# Patient Record
Sex: MALE | Race: WHITE | NOT HISPANIC OR LATINO | Employment: UNEMPLOYED | URBAN - METROPOLITAN AREA
[De-identification: names, ages, dates, MRNs, and addresses within clinical notes are randomized per-mention and may not be internally consistent; named-entity substitution may affect disease eponyms.]

---

## 2021-12-18 ENCOUNTER — HOSPITAL ENCOUNTER (EMERGENCY)
Facility: HOSPITAL | Age: 26
Discharge: HOME | End: 2021-12-19
Attending: EMERGENCY MEDICINE
Payer: COMMERCIAL

## 2021-12-18 VITALS
SYSTOLIC BLOOD PRESSURE: 131 MMHG | TEMPERATURE: 99.8 F | BODY MASS INDEX: 27.92 KG/M2 | OXYGEN SATURATION: 98 % | DIASTOLIC BLOOD PRESSURE: 87 MMHG | RESPIRATION RATE: 18 BRPM | HEIGHT: 70 IN | HEART RATE: 140 BPM | WEIGHT: 195 LBS

## 2021-12-18 DIAGNOSIS — Z20.822 ENCOUNTER FOR SCREENING LABORATORY TESTING FOR COVID-19 VIRUS: ICD-10-CM

## 2021-12-18 DIAGNOSIS — J06.9 URI (UPPER RESPIRATORY INFECTION): Primary | ICD-10-CM

## 2021-12-18 PROCEDURE — 0241U HB NFCT DS VIR RESP RNA 4 TRGT: CPT | Performed by: INTERNAL MEDICINE

## 2021-12-18 PROCEDURE — 93005 ELECTROCARDIOGRAM TRACING: CPT

## 2021-12-18 PROCEDURE — 96372 THER/PROPH/DIAG INJ SC/IM: CPT

## 2021-12-18 PROCEDURE — 99284 EMERGENCY DEPT VISIT MOD MDM: CPT

## 2021-12-18 RX ORDER — KETOROLAC TROMETHAMINE 30 MG/ML
30 INJECTION, SOLUTION INTRAMUSCULAR; INTRAVENOUS ONCE
Status: COMPLETED | OUTPATIENT
Start: 2021-12-18 | End: 2021-12-18

## 2021-12-18 RX ORDER — ACETAMINOPHEN 160 MG/5ML
2 SUSPENSION, ORAL (FINAL DOSE FORM) ORAL ONCE
Status: COMPLETED | OUTPATIENT
Start: 2021-12-18 | End: 2021-12-18

## 2021-12-18 RX ADMIN — KETOROLAC TROMETHAMINE 30 MG: 30 INJECTION, SOLUTION INTRAMUSCULAR at 23:36

## 2021-12-19 LAB
ATRIAL RATE: 137 BPM
FLUAV RNA RESP QL NAA+PROBE: NEGATIVE
FLUBV RNA RESP QL NAA+PROBE: NEGATIVE
P AXIS: 73 DEGREES
PR INTERVAL: 150 MS
QRS AXIS: 84 DEGREES
QRSD INTERVAL: 86 MS
QT INTERVAL: 272 MS
QTC INTERVAL: 410 MS
RSV RNA RESP QL NAA+PROBE: NEGATIVE
SARS-COV-2 RNA RESP QL NAA+PROBE: POSITIVE
T WAVE AXIS: 58 DEGREES
VENTRICULAR RATE: 137 BPM

## 2021-12-19 PROCEDURE — 93010 ELECTROCARDIOGRAM REPORT: CPT | Performed by: INTERNAL MEDICINE

## 2021-12-19 PROCEDURE — 99284 EMERGENCY DEPT VISIT MOD MDM: CPT | Performed by: EMERGENCY MEDICINE

## 2023-09-20 ENCOUNTER — APPOINTMENT (EMERGENCY)
Dept: RADIOLOGY | Facility: HOSPITAL | Age: 28
End: 2023-09-20

## 2023-09-20 ENCOUNTER — HOSPITAL ENCOUNTER (EMERGENCY)
Facility: HOSPITAL | Age: 28
Discharge: HOME | End: 2023-09-20
Attending: EMERGENCY MEDICINE

## 2023-09-20 VITALS
SYSTOLIC BLOOD PRESSURE: 156 MMHG | BODY MASS INDEX: 37.07 KG/M2 | RESPIRATION RATE: 20 BRPM | TEMPERATURE: 98.5 F | DIASTOLIC BLOOD PRESSURE: 82 MMHG | OXYGEN SATURATION: 95 % | HEART RATE: 83 BPM | WEIGHT: 258.38 LBS

## 2023-09-20 DIAGNOSIS — U07.1 COVID-19: ICD-10-CM

## 2023-09-20 DIAGNOSIS — R42 LIGHTHEADEDNESS: Primary | ICD-10-CM

## 2023-09-20 LAB
ALBUMIN SERPL BCP-MCNC: 4.2 G/DL (ref 3.5–5)
ALP SERPL-CCNC: 109 U/L (ref 34–104)
ALT SERPL W P-5'-P-CCNC: 33 U/L (ref 7–52)
ANION GAP SERPL CALCULATED.3IONS-SCNC: 10 MMOL/L
AST SERPL W P-5'-P-CCNC: 21 U/L (ref 13–39)
ATRIAL RATE: 91 BPM
BASOPHILS # BLD AUTO: 0.03 THOUSANDS/ÂΜL (ref 0–0.1)
BASOPHILS NFR BLD AUTO: 0 % (ref 0–1)
BILIRUB SERPL-MCNC: 0.64 MG/DL (ref 0.2–1)
BUN SERPL-MCNC: 10 MG/DL (ref 5–25)
CALCIUM SERPL-MCNC: 8.8 MG/DL (ref 8.4–10.2)
CHLORIDE SERPL-SCNC: 104 MMOL/L (ref 96–108)
CO2 SERPL-SCNC: 24 MMOL/L (ref 21–32)
CREAT SERPL-MCNC: 0.86 MG/DL (ref 0.6–1.3)
EOSINOPHIL # BLD AUTO: 0.11 THOUSAND/ÂΜL (ref 0–0.61)
EOSINOPHIL NFR BLD AUTO: 1 % (ref 0–6)
ERYTHROCYTE [DISTWIDTH] IN BLOOD BY AUTOMATED COUNT: 11.9 % (ref 11.6–15.1)
GFR SERPL CREATININE-BSD FRML MDRD: 119 ML/MIN/1.73SQ M
GLUCOSE SERPL-MCNC: 123 MG/DL (ref 65–140)
HCT VFR BLD AUTO: 45.3 % (ref 36.5–49.3)
HGB BLD-MCNC: 15.9 G/DL (ref 12–17)
IMM GRANULOCYTES # BLD AUTO: 0.04 THOUSAND/UL (ref 0–0.2)
IMM GRANULOCYTES NFR BLD AUTO: 0 % (ref 0–2)
LYMPHOCYTES # BLD AUTO: 2.2 THOUSANDS/ÂΜL (ref 0.6–4.47)
LYMPHOCYTES NFR BLD AUTO: 19 % (ref 14–44)
MCH RBC QN AUTO: 30.3 PG (ref 26.8–34.3)
MCHC RBC AUTO-ENTMCNC: 35.1 G/DL (ref 31.4–37.4)
MCV RBC AUTO: 86 FL (ref 82–98)
MONOCYTES # BLD AUTO: 1.42 THOUSAND/ÂΜL (ref 0.17–1.22)
MONOCYTES NFR BLD AUTO: 12 % (ref 4–12)
NEUTROPHILS # BLD AUTO: 7.77 THOUSANDS/ÂΜL (ref 1.85–7.62)
NEUTS SEG NFR BLD AUTO: 68 % (ref 43–75)
NRBC BLD AUTO-RTO: 0 /100 WBCS
P AXIS: 60 DEGREES
PLATELET # BLD AUTO: 230 THOUSANDS/UL (ref 149–390)
PMV BLD AUTO: 8.8 FL (ref 8.9–12.7)
POTASSIUM SERPL-SCNC: 3.3 MMOL/L (ref 3.5–5.3)
PR INTERVAL: 142 MS
PROT SERPL-MCNC: 7.3 G/DL (ref 6.4–8.4)
QRS AXIS: 78 DEGREES
QRSD INTERVAL: 90 MS
QT INTERVAL: 346 MS
QTC INTERVAL: 425 MS
RBC # BLD AUTO: 5.25 MILLION/UL (ref 3.88–5.62)
SODIUM SERPL-SCNC: 138 MMOL/L (ref 135–147)
T WAVE AXIS: 7 DEGREES
VENTRICULAR RATE: 91 BPM
WBC # BLD AUTO: 11.57 THOUSAND/UL (ref 4.31–10.16)

## 2023-09-20 PROCEDURE — 99285 EMERGENCY DEPT VISIT HI MDM: CPT

## 2023-09-20 PROCEDURE — 96360 HYDRATION IV INFUSION INIT: CPT

## 2023-09-20 PROCEDURE — 99285 EMERGENCY DEPT VISIT HI MDM: CPT | Performed by: EMERGENCY MEDICINE

## 2023-09-20 PROCEDURE — 71045 X-RAY EXAM CHEST 1 VIEW: CPT

## 2023-09-20 PROCEDURE — 80053 COMPREHEN METABOLIC PANEL: CPT | Performed by: EMERGENCY MEDICINE

## 2023-09-20 PROCEDURE — 93010 ELECTROCARDIOGRAM REPORT: CPT | Performed by: INTERNAL MEDICINE

## 2023-09-20 PROCEDURE — 85025 COMPLETE CBC W/AUTO DIFF WBC: CPT | Performed by: EMERGENCY MEDICINE

## 2023-09-20 PROCEDURE — 93005 ELECTROCARDIOGRAM TRACING: CPT

## 2023-09-20 PROCEDURE — 36415 COLL VENOUS BLD VENIPUNCTURE: CPT | Performed by: EMERGENCY MEDICINE

## 2023-09-20 RX ORDER — POTASSIUM CHLORIDE 20 MEQ/1
40 TABLET, EXTENDED RELEASE ORAL ONCE
Status: COMPLETED | OUTPATIENT
Start: 2023-09-20 | End: 2023-09-20

## 2023-09-20 RX ADMIN — POTASSIUM CHLORIDE 40 MEQ: 1500 TABLET, EXTENDED RELEASE ORAL at 07:27

## 2023-09-20 RX ADMIN — SODIUM CHLORIDE 1000 ML: 0.9 INJECTION, SOLUTION INTRAVENOUS at 06:29

## 2023-09-20 NOTE — DISCHARGE INSTRUCTIONS
Please follow up with your primary care provider concerning your visit today. Please return to the Emergency Department if you develop any fever, that does not improve with Tylenol or Motrin, vomiting, diarrhea, chest pain, difficulty breathing, abdominal pain, or for any other concerns. You may take Tylenol (up to 1 gram every 6 hours, DO NOT take more than 4 grams within 24 hours) or Motrin (up to 400 mg every 8 hours) for your pain.

## 2023-09-20 NOTE — ED ATTENDING ATTESTATION
9/20/2023  I, Jg Hoffman MD, saw and evaluated the patient. I have discussed the patient with the resident/non-physician practitioner and agree with the resident's/non-physician practitioner's findings, Plan of Care, and MDM as documented in the resident's/non-physician practitioner's note, except where noted. All available labs and Radiology studies were reviewed. I was present for key portions of any procedure(s) performed by the resident/non-physician practitioner and I was immediately available to provide assistance. At this point I agree with the current assessment done in the Emergency Department. I have conducted an independent evaluation of this patient a history and physical is as follows: Patient is a 34year old male with a few days of cough, headache, fever. Occasional nausea but not now. Had dizziness and near syncope in ED room with his girlfriend this AM who has similar sx now. No vomiting or diarrhea. No urinary sx. No travel. Had positive home covid test yesterday. Was last seen at [facility] ED on 12/18/21 for URI. [facility] website checked on this patient and last Rx filled was on 4/14/22 for vicodin for 1 day supply. NCAT. Mask in place. Neck supple. Lungs clear. Heart regular without murmur. Abdomen soft and nontender. Good bowel sounds. No edema. No rash noted. DDx including but not limited to: viral illness, pneumonia, URI, pharyngitis; doubt cellulitis, UTI, meningitis, meningococcemia, sinusitis, Lyme disease, West Nile virus; influenza, RSV, COVID-19. Differential diagnosis including but not limited to: Near syncope, cardiac arrhythmia; doubt MI, ACS, PE; metabolic abnormality; doubt intracranial process, seizure, anemia, GI bleed, dehydration. Will check labs and give tylenol. ED POCUS done by ED resident and JJT=[id number].      ED Course         Critical Care Time  Procedures

## 2023-09-20 NOTE — ED PROVIDER NOTES
History  Chief Complaint   Patient presents with   • Dizziness     Pt had positive covid test yesterday. Started feeling dizzy and weak tonight. Last dose of tylenol around 1900 last night, unsure if hes had a fever     (Falguni Castellanos) Falguni Castellanos is a 34 y.o. male who identifies as male    They presented to the emergency department on September 20, 2023. Patient presents with:  Dizziness: Pt had positive covid test yesterday. Started feeling dizzy and weak tonight. Last dose of tylenol around 1900 last night, unsure if hes had a fever. The patient states that he began with headache and fatigue yesterday, took a home COVID test, noted it to be positive. Patient states that he was accompanying his fiancée who is currently pregnant to the emergency department and while in the room suddenly felt very lightheaded as well as flushed, and requested to be seen. Patient now lying in the stretcher feels better at this time, denies any pain, headache, or flushing sensations. Patient notes that he did feel nauseous initially during the episode, however at this time feels well. Patient notes that he took Tylenol at midnight. Patient denies chest pain, difficulty breathing, abdominal pain, change in urination or bowel habits with the exception of feeling more gas with defecation, leg swelling, rash, or any other complaint at this time. Allergies include:  -- Apple Allergy Skin Test - Food Allergy -- Rash      Immunizations: There is no immunization history on file for this patient. Immunizations Reviewed. None       History reviewed. No pertinent past medical history. History reviewed. No pertinent surgical history. History reviewed. No pertinent family history. I have reviewed and agree with the history as documented.     E-Cigarette/Vaping     E-Cigarette/Vaping Substances     Social History     Tobacco Use   • Smoking status: Never   • Smokeless tobacco: Never   Substance Use Topics   • Alcohol use: Yes     Comment: social    • Drug use: Yes     Types: Marijuana        Review of Systems   Constitutional: Negative for chills and fever. HENT: Negative for ear pain and sore throat. Eyes: Negative for pain and visual disturbance. Respiratory: Negative for cough and shortness of breath. Cardiovascular: Negative for chest pain and palpitations. Gastrointestinal: Positive for nausea (Resolved). Negative for abdominal pain and vomiting. Genitourinary: Negative for dysuria and hematuria. Musculoskeletal: Negative for arthralgias and back pain. Skin: Negative for color change and rash. Neurological: Positive for light-headedness (Resolved). Negative for seizures and syncope. All other systems reviewed and are negative. Physical Exam  ED Triage Vitals   Temperature Pulse Respirations Blood Pressure SpO2   09/20/23 0609 09/20/23 0605 09/20/23 0605 09/20/23 0605 09/20/23 0605   98.5 °F (36.9 °C) 83 20 156/82 95 %      Temp Source Heart Rate Source Patient Position - Orthostatic VS BP Location FiO2 (%)   09/20/23 0609 09/20/23 0605 09/20/23 0605 09/20/23 0605 --   Oral Monitor Sitting Right arm       Pain Score       09/20/23 0605       No Pain             Orthostatic Vital Signs  Vitals:    09/20/23 0605   BP: 156/82   Pulse: 83   Patient Position - Orthostatic VS: Sitting       Physical Exam  Vitals and nursing note reviewed. Constitutional:       General: He is not in acute distress. Appearance: Normal appearance. HENT:      Head: Normocephalic and atraumatic. Right Ear: External ear normal.      Left Ear: External ear normal.      Nose: Nose normal.      Mouth/Throat:      Mouth: Mucous membranes are moist.   Eyes:      Conjunctiva/sclera: Conjunctivae normal.   Cardiovascular:      Rate and Rhythm: Normal rate and regular rhythm. Pulmonary:      Effort: Pulmonary effort is normal. No respiratory distress. Breath sounds: Normal breath sounds.    Abdominal: General: Abdomen is flat. Bowel sounds are normal.      Tenderness: There is no abdominal tenderness. There is no guarding or rebound. Musculoskeletal:         General: Normal range of motion. Cervical back: Normal range of motion. Skin:     General: Skin is warm and dry. Capillary Refill: Capillary refill takes less than 2 seconds. Neurological:      Mental Status: He is alert. Mental status is at baseline.    Psychiatric:         Mood and Affect: Mood normal.         ED Medications  Medications   sodium chloride 0.9 % bolus 1,000 mL (0 mL Intravenous Stopped 9/20/23 0728)   potassium chloride (K-DUR,KLOR-CON) CR tablet 40 mEq (40 mEq Oral Given 9/20/23 0727)       Diagnostic Studies  Results Reviewed     Procedure Component Value Units Date/Time    Comprehensive metabolic panel [[id number]]  (Abnormal) Collected: 09/20/23 0629    Lab Status: Final result Specimen: Blood from Arm, Left Updated: 09/20/23 0704     Sodium 138 mmol/L      Potassium 3.3 mmol/L      Chloride 104 mmol/L      CO2 24 mmol/L      ANION GAP 10 mmol/L      BUN 10 mg/dL      Creatinine 0.86 mg/dL      Glucose 123 mg/dL      Calcium 8.8 mg/dL      AST 21 U/L      ALT 33 U/L      Alkaline Phosphatase 109 U/L      Total Protein 7.3 g/dL      Albumin 4.2 g/dL      Total Bilirubin 0.64 mg/dL      eGFR 119 ml/min/1.73sq m     Narrative:      [place] guidelines for Chronic Kidney Disease (CKD):   •  Stage 1 with normal or high GFR (GFR > 90 mL/min/1.73 square meters)  •  Stage 2 Mild CKD (GFR = 60-89 mL/min/1.73 square meters)  •  Stage 3A Moderate CKD (GFR = 45-59 mL/min/1.73 square meters)  •  Stage 3B Moderate CKD (GFR = 30-44 mL/min/1.73 square meters)  •  Stage 4 Severe CKD (GFR = 15-29 mL/min/1.73 square meters)  •  Stage 5 End Stage CKD (GFR <15 mL/min/1.73 square meters)  Note: GFR calculation is accurate only with a steady state creatinine    CBC and differential [[id number]]  (Abnormal) Collected: 09/20/23 0629    Lab Status: Final result Specimen: Blood from Arm, Left Updated: 09/20/23 0648     WBC 11.57 Thousand/uL      RBC 5.25 Million/uL      Hemoglobin 15.9 g/dL      Hematocrit 45.3 %      MCV 86 fL      MCH 30.3 pg      MCHC 35.1 g/dL      RDW 11.9 %      MPV 8.8 fL      Platelets 567 Thousands/uL      nRBC 0 /100 WBCs      Neutrophils Relative 68 %      Immat GRANS % 0 %      Lymphocytes Relative 19 %      Monocytes Relative 12 %      Eosinophils Relative 1 %      Basophils Relative 0 %      Neutrophils Absolute 7.77 Thousands/µL      Immature Grans Absolute 0.04 Thousand/uL      Lymphocytes Absolute 2.20 Thousands/µL      Monocytes Absolute 1.42 Thousand/µL      Eosinophils Absolute 0.11 Thousand/µL      Basophils Absolute 0.03 Thousands/µL                  XR chest 1 view portable   ED Interpretation by Concepcion Huitron MD (09/20 [id number])   No acute cardio-pulmonary disease. Independently interpreted by myself. Procedures  ECG 12 Lead Documentation Only    Date/Time: 9/20/2023 6:40 AM    Performed by: Concepcion Huitron MD  Authorized by: Concepcion Huitron MD    Indications / Diagnosis:  Lightheadedness  ECG reviewed by me, the ED Provider: yes    Patient location:  ED  Previous ECG:     Previous ECG:  Compared to current    Comparison ECG info:   Tachycardia has resolved    Similarity:  Changes noted  Interpretation:     Interpretation: normal    Rate:     ECG rate:  91    ECG rate assessment: normal    Rhythm:     Rhythm: sinus rhythm    Ectopy:     Ectopy: none    QRS:     QRS axis:  Normal    QRS intervals:  Normal  Conduction:     Conduction: normal    ST segments:     ST segments:  Normal  T waves:     T waves: non-specific, flattening and inverted      Flattening:  AVF and V1    Inverted:  III  Comments:      Normal sinus rhythm at 91 bpm, no PAC, no PVC, nonspecific T wave flattening of aVF as well as V1, T wave inversion of lead III, no acute ST elevation or depression. ED Course                                       Medical Decision Making  Patient presents with:  Dizziness: Pt had positive covid test yesterday. Started feeling dizzy and weak tonight. Last dose of tylenol around 1900 last night, unsure if hes had a fever      Patient seen and examined noted to have clear lungs to auscultation bilaterally, abdomen soft non-tender without guarding or rebound, regular rate and rhythm.       Temp:  (98.5 °F (36.9 °C)) 98.5 °F (36.9 °C)  HR:  (83) 83  Resp:  (20) 20  BP: (156)/(82) 156/82    Differential diagnosis includes but is not limited to COVID-19, electrolyte, vasovagal.      Due to patient's history and presentation the following laboratory evidence was collected:  Recent Results (from the past 12 hour(s))  -ECG 12 lead:   Collection Time: 09/20/23  6:18 AM       Result                      Value             Ref Range           Ventricular Rate            91                BPM                 Atrial Rate                 91                BPM                 AK Interval                 142               ms                  QRSD Interval               90                ms                  QT Interval                 346               ms                  QTC Interval                425               ms                  P Axis                      60                degrees             QRS Axis                    78                degrees             T Wave Axis                 7                 degrees        -CBC and differential:   Collection Time: 09/20/23  6:29 AM       Result                      Value             Ref Range           WBC                         11.57 (H)         4.31 - 10.16*       RBC                         5.25              3.88 - 5.62 *       Hemoglobin                  15.9              12.0 - 17.0 *       Hematocrit                  45.3              36.5 - 49.3 %       MCV                         86                82 - 98 fL MCH                         30.3              26.8 - 34.3 *       MCHC                        35.1              31.4 - 37.4 *       RDW                         11.9              11.6 - 15.1 %       MPV                         8.8 (L)           8.9 - 12.7 fL       Platelets                   230               149 - 390 Th*       nRBC                        0                 /100 WBCs           Neutrophils Relative        68                43 - 75 %           Immat GRANS %               0                 0 - 2 %             Lymphocytes Relative        19                14 - 44 %           Monocytes Relative          12                4 - 12 %            Eosinophils Relative        1                 0 - 6 %             Basophils Relative          0                 0 - 1 %             Neutrophils Absolute        7.77 (H)          1.85 - 7.62 *       Immature Grans Absolute     0.04              0.00 - 0.20 *       Lymphocytes Absolute        2.20              0.60 - 4.47 *       Monocytes Absolute          1.42 (H)          0.17 - 1.22 *       Eosinophils Absolute        0.11              0.00 - 0.61 *       Basophils Absolute          0.03              0.00 - 0.10 *  -Comprehensive metabolic panel:   Collection Time: 09/20/23  6:29 AM       Result                      Value             Ref Range           Sodium                      138               135 - 147 mm*       Potassium                   3.3 (L)           3.5 - 5.3 mm*       Chloride                    104               96 - 108 mmo*       CO2                         24                21 - 32 mmol*       ANION GAP                   10                mmol/L              BUN                         10                5 - 25 mg/dL        Creatinine                  0.86              0.60 - 1.30 *       Glucose                     123               65 - 140 mg/*       Calcium                     8.8               8.4 - 10.2 m*       AST                         21 13 - 39 U/L         ALT                         33                7 - 52 U/L          Alkaline Phosphatase        109 (H)           34 - 104 U/L        Total Protein               7.3               6.4 - 8.4 g/*       Albumin                     4.2               3.5 - 5.0 g/*       Total Bilirubin             0.64              0.20 - 1.00 *       eGFR                        119               ml/min/1.73s*    Due to patient's history and presentation the following imaging was collected:  XR chest 1 view portable   ED Interpretation    No acute cardio-pulmonary disease. Independently interpreted by myself. EKG: interpreted by myself as above. Patient was administered:      Medication Administration - last 24 hours from 09/19/2023 0731 to   09/20/2023 0731       Date/Time Order Dose Route Action Action by     09/20/2023 0728 EDT sodium chloride 0.9 % bolus 1,000 mL 0 mL   Intravenous Stopped Juan Alberto Damian RN     09/20/2023 3480 EDT sodium chloride 0.9 % bolus 1,000 mL 1,000 mL   Intravenous 1000 Pramod Dunn RN     09/20/2023 5382 EDT potassium chloride (K-DUR,KLOR-CON) CR tablet 40 mEq   40 mEq Oral Given Juan Alberto Damian RN        Patient appears well, is nontoxic appearing, expresses understanding and agrees with plan of care at this time. In light of this patient would benefit from outpatient management. COVID-19: acute illness or injury  Lightheadedness: acute illness or injury  Amount and/or Complexity of Data Reviewed  Labs: ordered. Radiology: ordered and independent interpretation performed. Risk  Prescription drug management.             Disposition  Final diagnoses:   Lightheadedness   COVID-19     Time reflects when diagnosis was documented in both MDM as applicable and the Disposition within this note     Time User Action Codes Description Comment    9/20/2023  7:21 AM geovani Peng Add [R42] Lightheadedness     9/20/2023  7:30 AM [id number]  [U07.1] COVID-19       ED Disposition     ED Disposition   Discharge    Condition   Stable    Date/Time   Wed Sep 20, 2023  7:21 AM    Comment   [facility] hospitals discharge to home/self care. Follow-up Information    None         There are no discharge medications for this patient. No discharge procedures on file. PDMP Review       Value Time User    PDMP Reviewed  Yes 9/20/2023  6:17 AM Johanny Nathan MD           ED Provider  Attending physically available and evaluated [facility] hospitals. I managed the patient along with the ED Attending.     Electronically Signed by         Willie Teixeira MD  09/20/23 7258